# Patient Record
Sex: FEMALE | URBAN - METROPOLITAN AREA
[De-identification: names, ages, dates, MRNs, and addresses within clinical notes are randomized per-mention and may not be internally consistent; named-entity substitution may affect disease eponyms.]

---

## 2017-01-06 ENCOUNTER — OFFICE VISIT (OUTPATIENT)
Dept: FAMILY MEDICINE CLINIC | Age: 22
End: 2017-01-06

## 2017-01-06 ENCOUNTER — HOSPITAL ENCOUNTER (OUTPATIENT)
Dept: LAB | Age: 22
Discharge: HOME | End: 2017-01-06
Payer: OTHER GOVERNMENT

## 2017-01-06 VITALS
WEIGHT: 180 LBS | BODY MASS INDEX: 28.93 KG/M2 | OXYGEN SATURATION: 98 % | TEMPERATURE: 98.1 F | HEART RATE: 68 BPM | SYSTOLIC BLOOD PRESSURE: 124 MMHG | HEIGHT: 66 IN | DIASTOLIC BLOOD PRESSURE: 63 MMHG | RESPIRATION RATE: 20 BRPM

## 2017-01-06 DIAGNOSIS — E55.9 VITAMIN D DEFICIENCY: ICD-10-CM

## 2017-01-06 DIAGNOSIS — Z23 ENCOUNTER FOR IMMUNIZATION: Primary | ICD-10-CM

## 2017-01-06 PROCEDURE — 82306 VITAMIN D 25 HYDROXY: CPT | Performed by: NURSE PRACTITIONER

## 2017-01-06 PROCEDURE — 36415 COLL VENOUS BLD VENIPUNCTURE: CPT | Performed by: NURSE PRACTITIONER

## 2017-01-06 NOTE — PROGRESS NOTES
Marquita Gave 22 y.o. female   Chief Complaint   Patient presents with    Follow-up    Vitamin D Deficiency    Immunization/Injection     Restart HEP B series         1. Have you been to the ER, urgent care clinic since your last visit? Hospitalized since your last visit? No    2. Have you seen or consulted any other health care providers outside of the [address] [place] [address] since your last visit? Include any pap smears or colon screening.  No

## 2017-01-06 NOTE — PATIENT INSTRUCTIONS
Please contact our office if you have any questions about your visit today. Hepatitis B Vaccine: What You Need to Know  Why get vaccinated? Hepatitis B is a serious disease that affects the liver. It is caused by the hepatitis B virus. Hepatitis B can cause mild illness lasting a few weeks, or it can lead to a serious, lifelong illness. Hepatitis B virus infection can be either acute or chronic. Acute hepatitis B virus infection is a short-term illness that occurs within the first 6 months after someone is exposed to the hepatitis B virus. This can lead to:  · Fever, fatigue, loss of appetite, nausea, and/or vomiting. · Jaundice (yellow skin or eyes, dark urine, paul-colored bowel movements). · Pain in muscles, joints, and stomach. Chronic hepatitis B virus infection is a long-term illness that occurs when the hepatitis B virus remains in a person's body. Most people who go on to develop chronic hepatitis B do not have symptoms, but it is still very serious and can lead to:  · Liver damage (cirrhosis). · Liver cancer. · Death. Chronically-infected people can spread hepatitis B virus to others, even if they do not feel or look sick themselves. Up to 1.4 million people in the [place] may have chronic hepatitis B infection. About 90% of infants who get hepatitis B become chronically infected and about 1 out of 4 of them dies. Hepatitis B is spread when blood, semen, or other body fluid infected with the Hepatitis B virus enters the body of a person who is not infected. People can become infected with the virus through:  · Birth (a baby whose mother is infected can be infected at or after birth). · Sharing items such as razors or toothbrushes with an infected person  · Contact with the blood or open sores of an infected person. · Sex with an infected partner. · Sharing needles, syringes, or other drug-injection equipment. · Exposure to blood from needlesticks or other sharp instruments.   Each year about 2,000 people in the [facility] die from hepatitis B-related liver disease. Hepatitis B vaccine can prevent hepatitis B and its consequences, including liver cancer and cirrhosis. Hepatitis B vaccine  Hepatitis B vaccine is made from parts of the hepatitis B virus. It cannot cause hepatitis B infection. The vaccine is usually given as 3 or 4 shots over a 6-month period. Infants should get their first dose of hepatitis B vaccine at birth and will usually complete the series at 7 months of age. All children and adolescents younger than 23years of age who have not yet gotten the vaccine should also be vaccinated. Hepatitis B vaccine is recommended for unvaccinated adults who are at risk for hepatitis B virus infection, including:  · People whose sex partners have hepatitis. · Sexually active persons who are not in a long-term monogamous relationship. · Persons seeking evaluation or treatment for a sexually transmitted disease. · Men who have sexual contact with other men. · People who share needles, syringes, or other drug-injection equipment. · People who have household contact with someone infected with the hepatitis B virus. · Health care and public safety workers at risk for exposure to blood or body fluids. · Residents and staff of facilities for developmentally disabled persons. · Persons in correctional facilities. · Victims of sexual assault or abuse. · Travelers to regions with increased rates of hepatitis B.  · People with chronic liver disease, kidney disease, HIV infection, or diabetes. · Anyone who wants to be protected from hepatitis B. There are no known risks to getting hepatitis B vaccine at the same time as other vaccines. Some people should not get this vaccine  Tell the person who is giving the vaccine:  · If the person getting the vaccine has any severe, life-threatening allergies.  If you ever had a life-threatening allergic reaction after a dose of hepatitis B vaccine, or have a severe allergy to any part of this vaccine, you may be advised not to get vaccinated. Ask your health care provider if you want information about vaccine components. · If the person getting the vaccine is not feeling well. If you have a mild illness, such as a cold, you can probably get the vaccine today. If you are moderately or severely ill, you should probably wait until you recover. Your doctor can advise you. Risks of a vaccine reaction  With any medicine, including vaccines, there is a chance of side effects. These are usually mild and go away on their own, but serious reactions are also possible. Most people who get hepatitis B vaccine do not have any problems with it. Minor problems following hepatitis B vaccine include:  · Soreness where the shot was given. · Temperature of 99.9°F or higher. If these problems occur, they usually begin soon after the shot and last 1 or 2 days. Your doctor can tell you more about these reactions. Other problems that could happen after this vaccine  · People sometimes faint after a medical procedure, including vaccination. Sitting or lying down for about 15 minutes can help prevent fainting and injuries caused by a fall. Tell your provider if you feel dizzy, or have vision changes or ringing in the ears. · Some people get shoulder pain that can be more severe and longer-lasting than the more routine soreness that can follow injections. This happens very rarely. · Any medication can cause a severe allergic reaction. Such reactions from a vaccine are very rare, estimated at about 1 in a million doses, and would happen within a few minutes to a few hours after the vaccination. As with any medicine, there is a very remote chance of a vaccine causing a serious injury or death. The safety of vaccines is always being monitored. For more information, visit: www.cdc.gov/vaccinesafety. What if there is a serious problem? What should I look for?   · Look for anything that concerns you, such as signs of a severe allergic reaction, very high fever, or unusual behavior. Signs of a severe allergic reaction can include hives, swelling of the face and throat, difficulty breathing, a fast heartbeat, dizziness, and weakness. These would usually start a few minutes to a few hours after the vaccination. What should I do? · If you think it is a severe allergic reaction or other emergency that can't wait, call 911 or get the person to the nearest hospital. Otherwise, call your clinic. Afterward, the reaction should be reported to the Vaccine Adverse Event Reporting System (VAERS). Your doctor should file this report, or you can do it yourself through the VAERS web site at www.vaers. hhs.gov, or by calling 9-837.171.8981. VAERS does not give medical advice. The National Vaccine Injury Compensation Program  The National Vaccine Injury Compensation Program (VICP) is a federal program that was created to compensate people who may have been injured by certain vaccines. Persons who believe they may have been injured by a vaccine can learn about the program and about filing a claim by calling 4-112.979.4212 or visiting the [facility] website at www.[facility].gov/vaccinecompensation. There is a time limit to file a claim for compensation. How can I learn more? · Ask your healthcare provider. He or she can give you the vaccine package insert or suggest other sources of information. · Call your local or state health department. · Contact the Centers for Disease Control and Prevention (CDC):  ¨ Call 9-894.813.3396 (1-800-CDC-INFO). ¨ Visit CDC's website at www.cdc.gov/vaccines. Vaccine Information Statement  Hepatitis B Vaccine  7/20/2016  42 U. S.C. § 300aa-26  [place]. S. Department of Health and Human Services  Centers for Disease Control and Prevention  Many Vaccine Information Statements are available in [place] and other languages. See www.immunize.org/vis.   Hojas de [employer] vacunas están disponibles en español y en otros idiomas. Visite www.immunize.org/vis. Care instructions adapted under license by your healthcare professional. If you have questions about a medical condition or this instruction, always ask your healthcare professional. [address] any warranty or liability for your use of this information. Learning About Vitamin D  Why is it important to get enough vitamin D? Your body needs vitamin D to absorb calcium. Calcium keeps your bones and muscles, including your heart, healthy and strong. If your muscles don't get enough calcium, they can cramp, hurt, or feel weak. You may have long-term (chronic) muscle aches and pains. If you don't get enough vitamin D throughout life, you have an increased chance of having thin and brittle bones (osteoporosis) in your later years. Children who don't get enough vitamin D may not grow as much as others their age. They also have a chance of getting a rare disease called rickets. It causes weak bones. Vitamin D and calcium are added to many foods. And your body uses sunshine to make its own vitamin D. How much vitamin D do you need? The [employer] recommends that people ages 3 through 79 get 600 IU (international units) every day. Adults 71 and older need 800 IU every day. Blood tests for vitamin D can check your vitamin D level. But there is no standard normal range used by all laboratories. The [employer] recommends a blood level of 20 ng/mL of vitamin D for healthy bones. And most people in the [place] and [facility]) meet this goal.  How can you get more vitamin D? Foods that contain vitamin D include:  · [place], tuna, and mackerel. These are some of the best foods to eat when you need to get more vitamin D.  · Cheese, egg yolks, and beef liver. These foods have vitamin D in small amounts.   · Milk, soy drinks, orange juice, yogurt, margarine, and some kinds of cereal have vitamin D added to them. Some people don't make vitamin D as well as others. They may have to take extra care in getting enough vitamin D. Things that reduce how much vitamin D your body makes include:  · Dark skin, such as many  Americans have. · Age, especially if you are older than 72. · Digestive problems, such as Crohn's or celiac disease. · Liver and kidney disease. Some people who do not get enough vitamin D may need supplements. Are there any risks from taking vitamin D?  · Too much vitamin D:  ¨ Can damage your kidneys. ¨ Can cause nausea and vomiting, constipation, and weakness. ¨ Raises the amount of calcium in your blood. If this happens, you can get confused or have an irregular heart rhythm. · Vitamin D may interact with other medicines. Tell your doctor about all of the medicines you take, including over-the-counter drugs, herbs, and pills. Tell your doctor about all of your current medical problems. Where can you learn more? Go to http://jerome-jules.info/. Enter [id number] in the search box to learn more about \"Learning About Vitamin D.\"  Current as of: July 26, 2016  Content Version: 11.1  © 6640-6575 [employer]. Care instructions adapted under license by [employer] (which disclaims liability or warranty for this information). If you have questions about a medical condition or this instruction, always ask your healthcare professional. [address] any warranty or liability for your use of this information.

## 2017-01-06 NOTE — PROGRESS NOTES
MARIANO Corrales is a 22 y.o. female   Chief Complaint   Patient presents with    Follow-up    Vitamin D Deficiency    Immunization/Injection     Restart HEP B series     Requesting refill on vitamin D but admits to not taking high dose therapy as previously prescribed. Denies muscle and or leg cramps/pain. Denies chest pain. Denies shortness of breath. Denies abdominal pain, nausea and or vomiting. Past Medical History  Past Medical History   Diagnosis Date    Acne cystic     Appendicitis with peritoneal abscess     Neck fracture (HCC)      C1, C2       Surgical History  Past Surgical History   Procedure Laterality Date    Pr appendectomy,rupt appendx+abscess      Pr cerv spine fusn,anter,[id number]       hip graft        Medications  Current Outpatient Prescriptions   Medication Sig Dispense Refill    drospirenone-ethinyl estradiol (OCELLA) 3-0.03 mg tab Take 1 Tab by mouth daily. 28 Tab 11    clotrimazole-betamethasone (LOTRISONE) topical cream Apply BID-TID x 14 days. Keep applying an additional week after rash clears. 30 g 1    tetrahydrozoline (VISINE) 0.05 % ophthalmic solution Administer 1 Drop to both eyes four (4) times daily.  clindamycin-benzoyl peroxide (DUAC) 1-5 % topical gel Apply  to affected area two (2) times daily as needed. Apply to affected area after the skin has been cleansed and dried.  ibuprofen (MOTRIN) 200 mg tablet Take 200 mg by mouth every six (6) hours as needed.  ergocalciferol (ERGOCALCIFEROL) 50,000 unit capsule Take 1 Cap by mouth two (2) times a week.  8 Cap 12       Allergies  No Known Allergies    Family History  Family History   Problem Relation Age of Onset    MS Maternal Grandmother      suspected    Arthritis-osteo Maternal Grandfather     Hypertension Paternal Grandmother     Diabetes Paternal Grandfather     Hypertension Paternal Grandfather     No Known Problems Mother        Social History  Social History     Social History  Marital status: SINGLE     Spouse name: N/A    Number of children: N/A    Years of education: N/A     Occupational History    Not on file. Social History Main Topics    Smoking status: Never Smoker    Smokeless tobacco: Never Used    Alcohol use No    Drug use: Yes     Special: Prescription, OTC    Sexual activity: Not on file     Other Topics Concern    Not on file     Social History Narrative       Problem List  Patient Active Problem List   Diagnosis Code    Cystic acne L70.0       Review of Systems  Review of Systems   Constitutional: Negative for chills, fever, malaise/fatigue and weight loss. Respiratory: Negative for shortness of breath. Cardiovascular: Negative for chest pain. Gastrointestinal: Negative for abdominal pain, blood in stool, diarrhea, nausea and vomiting. Skin: Negative for rash. Neurological: Negative for headaches. Vital Signs  Vitals:    01/06/17 1108   BP: 124/63   Pulse: 68   Resp: 20   Temp: 98.1 °F (36.7 °C)   TempSrc: Oral   SpO2: 98%   Weight: 180 lb (81.6 kg)   Height: 5' 6\" (1.676 m)   PainSc:   0 - No pain   LMP: 12/20/2016       Physical Exam  Physical Exam   Constitutional: She is oriented to person, place, and time. Eyes: Conjunctivae are normal.   Cardiovascular: Normal rate and regular rhythm. Pulmonary/Chest: Effort normal and breath sounds normal. No respiratory distress. Abdominal: Soft. Bowel sounds are normal.   Neurological: She is alert and oriented to person, place, and time. Psychiatric: She has a normal mood and affect. Her behavior is normal.   Vitals reviewed.       Diagnostics  Orders Placed This Encounter    Hepatitis B vaccine, adult dosage, IM    VITAMIN D, 25 HYDROXY     Standing Status:   Future     Standing Expiration Date:   1/7/2018       Results  Results for orders placed or performed during the hospital encounter of 06/07/16   TSH 3RD GENERATION   Result Value Ref Range    TSH 1.45 0.36 - 3.74 uIU/mL   T4, FREE Result Value Ref Range    T4, Free 1.1 0.7 - 1.5 NG/DL   T3, FREE   Result Value Ref Range    Triiodothyronine (T3), free 3.4 2.3 - 4.2 PG/ML   VITAMIN D, 25 HYDROXY   Result Value Ref Range    Vitamin D 25-Hydroxy 19.0 (L) 30 - 100 ng/mL   CBC WITH AUTOMATED DIFF   Result Value Ref Range    WBC 7.6 4.6 - 13.2 K/uL    RBC 4.42 4.20 - 5.30 M/uL    HGB 11.7 (L) 12.0 - 16.0 g/dL    HCT 36.1 35.0 - 45.0 %    MCV 81.7 74.0 - 97.0 FL    MCH 26.5 24.0 - 34.0 PG    MCHC 32.4 31.0 - 37.0 g/dL    RDW 13.8 11.6 - 14.5 %    PLATELET 493 503 - 542 K/uL    MPV 9.8 9.2 - 11.8 FL    NEUTROPHILS 59 40 - 73 %    LYMPHOCYTES 33 21 - 52 %    MONOCYTES 6 3 - 10 %    EOSINOPHILS 2 0 - 5 %    BASOPHILS 0 0 - 2 %    ABS. NEUTROPHILS 4.4 1.8 - 8.0 K/UL    ABS. LYMPHOCYTES 2.5 0.9 - 3.6 K/UL    ABS. MONOCYTES 0.5 0.05 - 1.2 K/UL    ABS. EOSINOPHILS 0.1 0.0 - 0.4 K/UL    ABS. BASOPHILS 0.0 0.0 - 0.06 K/UL    DF AUTOMATED             Assessment and Plan  Altaf Rios was seen today for follow-up, vitamin d deficiency and immunization/injection. Diagnoses and all orders for this visit:    Encounter for immunization  -     Hepatitis B vaccine, adult dosage, IM    Vitamin D deficiency  -     VITAMIN D, 25 HYDROXY; Future        After care summary printed and reviewed with patient. Plan reviewed with patient. Questions answered. Patient verbalized understanding of plan and is in agreement with plan. Patient to follow up as needed with  Dr. Lela Shah or earlier if symptoms occur or worsen. Encouraged the use of my chart.     Justen Moore, DAISYP-C

## 2017-01-06 NOTE — MR AVS SNAPSHOT
Visit Information Date & Time Provider Department Dept. Phone Encounter #  
 1/6/2017 11:00 AM Elda Pineda NP [facility] [phone] [id number] Follow-up Instructions Return if symptoms worsen or fail to improve. Upcoming Health Maintenance Date Due  
 HPV AGE 9Y-34Y (1 of 3 - Female 3 Dose Series) 9/29/2007 DTaP/Tdap/Td series (7 - Td) 4/28/2020 Allergies as of 1/6/2017  Review Complete On: 1/6/2017 By: Elda Pineda NP No Known Allergies Current Immunizations  Reviewed on 1/6/2017 Name Date DTaP 6/12/2001, 2/13/1999, 4/22/1997, 2/12/1997, 1996 Hep A Vaccine 2/17/2004, 8/15/2003 Hep B Vaccine 2/12/1997, 1996, 1996 Hep B Vaccine (Adult) 1/6/2017 Hib 4/22/1997, 2/12/1997, 1996 Influenza Vaccine 10/2/2016 MMR 6/12/2001, 2/23/1999 Pneumococcal Vaccine (Unspecified Type) 4/22/1997, 2/12/1997 Poliovirus vaccine 6/12/2001, 1996 TB Skin Test (PPD) Intradermal 1/13/2016, 1/19/2015 11:17 AM, 1/7/2015 Tdap 4/28/2010 Varicella Virus Vaccine 2/23/1999 Reviewed by Veronica Moore LPN on 3/6/9281 at 37:20 AM  
You Were Diagnosed With   
  
 Codes Comments Encounter for immunization    -  Primary ICD-10-CM: H41 ICD-9-CM: V03.89 Vitamin D deficiency     ICD-10-CM: E55.9 ICD-9-CM: 268.9 Vitals BP Pulse Temp Resp Height(growth percentile) Weight(growth percentile) 124/63 (BP 1 Location: Left arm, BP Patient Position: Sitting) 68 98.1 °F (36.7 °C) (Oral) 20 5' 6\" (1.676 m) 180 lb (81.6 kg) LMP SpO2 BMI OB Status Smoking Status 12/20/2016 98% 29.05 kg/m2 Having regular periods Never Smoker BMI and BSA Data Body Mass Index Body Surface Area 29.05 kg/m 2 1.95 m 2 Preferred Pharmacy Pharmacy Name Phone [id number], [address] [place] [address] Your Updated Medication List  
  
   
 This list is accurate as of: 1/6/17 11:40 AM.  Always use your most recent med list.  
  
  
  
  
 clotrimazole-betamethasone topical cream  
Commonly known as:  Alphonsa Spies Apply BID-TID x 14 days. Keep applying an additional week after rash clears. drospirenone-ethinyl estradiol 3-0.03 mg Tab Commonly known as:  Linda Rangel Take 1 Tab by mouth daily. DUAC 1-5 % topical gel Generic drug:  clindamycin-benzoyl peroxide Apply  to affected area two (2) times daily as needed. Apply to affected area after the skin has been cleansed and dried. ergocalciferol 50,000 unit capsule Commonly known as:  ERGOCALCIFEROL Take 1 Cap by mouth two (2) times a week. ibuprofen 200 mg tablet Commonly known as:  MOTRIN Take 200 mg by mouth every six (6) hours as needed. VISINE 0.05 % ophthalmic solution Generic drug:  tetrahydrozoline Administer 1 Drop to both eyes four (4) times daily. We Performed the Following HEPATITIS B VACCINE, ADULT DOSAGE, IM [44030 CPT(R)] Follow-up Instructions Return if symptoms worsen or fail to improve. To-Do List   
 01/06/2017 Lab:  VITAMIN D, 25 HYDROXY Patient Instructions Please contact our office if you have any questions about your visit today. Hepatitis B Vaccine: What You Need to Know Why get vaccinated? Hepatitis B is a serious disease that affects the liver. It is caused by the hepatitis B virus. Hepatitis B can cause mild illness lasting a few weeks, or it can lead to a serious, lifelong illness. Hepatitis B virus infection can be either acute or chronic. Acute hepatitis B virus infection is a short-term illness that occurs within the first 6 months after someone is exposed to the hepatitis B virus. This can lead to: · Fever, fatigue, loss of appetite, nausea, and/or vomiting. · Jaundice (yellow skin or eyes, dark urine, paul-colored bowel movements). · Pain in muscles, joints, and stomach. Chronic hepatitis B virus infection is a long-term illness that occurs when the hepatitis B virus remains in a person's body. Most people who go on to develop chronic hepatitis B do not have symptoms, but it is still very serious and can lead to: · Liver damage (cirrhosis). · Liver cancer. · Death. Chronically-infected people can spread hepatitis B virus to others, even if they do not feel or look sick themselves. Up to 1.4 million people in the [place] may have chronic hepatitis B infection. About 90% of infants who get hepatitis B become chronically infected and about 1 out of 4 of them dies. Hepatitis B is spread when blood, semen, or other body fluid infected with the Hepatitis B virus enters the body of a person who is not infected. People can become infected with the virus through: · Birth (a baby whose mother is infected can be infected at or after birth). · Sharing items such as razors or toothbrushes with an infected person · Contact with the blood or open sores of an infected person. · Sex with an infected partner. · Sharing needles, syringes, or other drug-injection equipment. · Exposure to blood from needlesticks or other sharp instruments. Each year about 2,000 people in the [facility] die from hepatitis B-related liver disease. Hepatitis B vaccine can prevent hepatitis B and its consequences, including liver cancer and cirrhosis. Hepatitis B vaccine Hepatitis B vaccine is made from parts of the hepatitis B virus. It cannot cause hepatitis B infection. The vaccine is usually given as 3 or 4 shots over a 6-month period. Infants should get their first dose of hepatitis B vaccine at birth and will usually complete the series at 7 months of age. All children and adolescents younger than 23years of age who have not yet gotten the vaccine should also be vaccinated.  
Hepatitis B vaccine is recommended for unvaccinated adults who are at risk for hepatitis B virus infection, including: · People whose sex partners have hepatitis. · Sexually active persons who are not in a long-term monogamous relationship. · Persons seeking evaluation or treatment for a sexually transmitted disease. · Men who have sexual contact with other men. · People who share needles, syringes, or other drug-injection equipment. · People who have household contact with someone infected with the hepatitis B virus. · Health care and public safety workers at risk for exposure to blood or body fluids. · Residents and staff of facilities for developmentally disabled persons. · Persons in correctional facilities. · Victims of sexual assault or abuse. · Travelers to regions with increased rates of hepatitis B. 
· People with chronic liver disease, kidney disease, HIV infection, or diabetes. · Anyone who wants to be protected from hepatitis B. There are no known risks to getting hepatitis B vaccine at the same time as other vaccines. Some people should not get this vaccine Tell the person who is giving the vaccine: · If the person getting the vaccine has any severe, life-threatening allergies. If you ever had a life-threatening allergic reaction after a dose of hepatitis B vaccine, or have a severe allergy to any part of this vaccine, you may be advised not to get vaccinated. Ask your health care provider if you want information about vaccine components. · If the person getting the vaccine is not feeling well. If you have a mild illness, such as a cold, you can probably get the vaccine today. If you are moderately or severely ill, you should probably wait until you recover. Your doctor can advise you. Risks of a vaccine reaction With any medicine, including vaccines, there is a chance of side effects. These are usually mild and go away on their own, but serious reactions are also possible. Most people who get hepatitis B vaccine do not have any problems with it. Minor problems following hepatitis B vaccine include: · Soreness where the shot was given. · Temperature of 99.9°F or higher. If these problems occur, they usually begin soon after the shot and last 1 or 2 days. Your doctor can tell you more about these reactions. Other problems that could happen after this vaccine · People sometimes faint after a medical procedure, including vaccination. Sitting or lying down for about 15 minutes can help prevent fainting and injuries caused by a fall. Tell your provider if you feel dizzy, or have vision changes or ringing in the ears. · Some people get shoulder pain that can be more severe and longer-lasting than the more routine soreness that can follow injections. This happens very rarely. · Any medication can cause a severe allergic reaction. Such reactions from a vaccine are very rare, estimated at about 1 in a million doses, and would happen within a few minutes to a few hours after the vaccination. As with any medicine, there is a very remote chance of a vaccine causing a serious injury or death. The safety of vaccines is always being monitored. For more information, visit: www.cdc.gov/vaccinesafety. What if there is a serious problem? What should I look for? · Look for anything that concerns you, such as signs of a severe allergic reaction, very high fever, or unusual behavior. Signs of a severe allergic reaction can include hives, swelling of the face and throat, difficulty breathing, a fast heartbeat, dizziness, and weakness. These would usually start a few minutes to a few hours after the vaccination. What should I do? · If you think it is a severe allergic reaction or other emergency that can't wait, call 911 or get the person to the nearest hospital. Otherwise, call your clinic. Afterward, the reaction should be reported to the Vaccine Adverse Event Reporting System (VAERS).  Your doctor should file this report, or you can do it yourself through the VAERS web site at www.vaers. hhs.gov, or by calling 4-332.943.9517. VAERS does not give medical advice. The National Vaccine Injury Compensation Program 
The National Vaccine Injury Compensation Program (VICP) is a federal program that was created to compensate people who may have been injured by certain vaccines. Persons who believe they may have been injured by a vaccine can learn about the program and about filing a claim by calling 8-312.723.9013 or visiting the SocialRep website at www.[facility].gov/vaccinecompensation. There is a time limit to file a claim for compensation. How can I learn more? · Ask your healthcare provider. He or she can give you the vaccine package insert or suggest other sources of information. · Call your local or state health department. · Contact the Centers for Disease Control and Prevention (CDC): 
¨ Call 9-890.872.9690 (1-800-CDC-INFO). ¨ Visit CDC's website at www.cdc.gov/vaccines. Vaccine Information Statement Hepatitis B Vaccine 7/20/2016 
42 MYLENE Schaffer [id number] [place]. [place]. Department of Health and [employer] Centers for Disease Control and Prevention Many Vaccine Information Statements are available in [place] and other languages. See www.immunize.org/vis. Hojas de información sobre vacunas están disponibles en español y en otros idiomas. Visite www.immunize.org/vis. Care instructions adapted under license by your healthcare professional. If you have questions about a medical condition or this instruction, always ask your healthcare professional. [place] [address] any warranty or liability for your use of this information. Learning About Vitamin D Why is it important to get enough vitamin D? Your body needs vitamin D to absorb calcium. Calcium keeps your bones and muscles, including your heart, healthy and strong. If your muscles don't get enough calcium, they can cramp, hurt, or feel weak.  You may have long-term (chronic) muscle aches and pains. If you don't get enough vitamin D throughout life, you have an increased chance of having thin and brittle bones (osteoporosis) in your later years. Children who don't get enough vitamin D may not grow as much as others their age. They also have a chance of getting a rare disease called rickets. It causes weak bones. Vitamin D and calcium are added to many foods. And your body uses sunshine to make its own vitamin D. How much vitamin D do you need? The [place] of [employer] recommends that people ages 3 through 79 get 600 IU (international units) every day. Adults 71 and older need 800 IU every day. Blood tests for vitamin D can check your vitamin D level. But there is no standard normal range used by all laboratories. The [place] of Medicine recommends a blood level of 20 ng/mL of vitamin D for healthy bones. And most people in the [place] and [facility]) meet this goal. 
How can you get more vitamin D? Foods that contain vitamin D include: 
· Pandora, tuna, and mackerel. These are some of the best foods to eat when you need to get more vitamin D. 
· Cheese, egg yolks, and beef liver. These foods have vitamin D in small amounts. · Milk, soy drinks, orange juice, yogurt, margarine, and some kinds of cereal have vitamin D added to them. Some people don't make vitamin D as well as others. They may have to take extra care in getting enough vitamin D. Things that reduce how much vitamin D your body makes include: · Dark skin, such as many  Americans have. · Age, especially if you are older than 72. · Digestive problems, such as Crohn's or celiac disease. · Liver and kidney disease. Some people who do not get enough vitamin D may need supplements. Are there any risks from taking vitamin D? 
· Too much vitamin D: 
¨ Can damage your kidneys. ¨ Can cause nausea and vomiting, constipation, and weakness. ¨ Raises the amount of calcium in your blood. If this happens, you can get confused or have an irregular heart rhythm. · Vitamin D may interact with other medicines. Tell your doctor about all of the medicines you take, including over-the-counter drugs, herbs, and pills. Tell your doctor about all of your current medical problems. Where can you learn more? Go to http://jerome-jules.info/. Enter [id number] in the search box to learn more about \"Learning About Vitamin D.\" 
Current as of: July 26, 2016 Content Version: 11.1 © 4835-5094 [employer]. Care instructions adapted under license by [employer] (which disclaims liability or warranty for this information). If you have questions about a medical condition or this instruction, always ask your healthcare professional. [address] any warranty or liability for your use of this information. Introducing \[facility]\"" & HEALTH SERVICES! Dear Romana Drew: 
Thank you for requesting a CHOOMOGO account. Our records indicate that you already have an active CHOOMOGO account. You can access your account anytime at [email or web address] Did you know that you can access your hospital and ER discharge instructions at any time in CHOOMOGO? You can also review all of your test results from your hospital stay or ER visit. Additional Information If you have questions, please visit the Frequently Asked Questions section of the CHOOMOGO website at [email or web address] Remember, CHOOMOGO is NOT to be used for urgent needs. For medical emergencies, dial 911. Now available from your iPhone and Android! Please provide this summary of care documentation to your next provider. Your primary care clinician is listed as Yobany Childress. If you have any questions after today's visit, please call [phone].

## 2017-01-10 ENCOUNTER — CLINICAL SUPPORT (OUTPATIENT)
Dept: FAMILY MEDICINE CLINIC | Age: 22
End: 2017-01-10

## 2017-01-10 VITALS — TEMPERATURE: 97.9 F

## 2017-01-10 DIAGNOSIS — Z11.1 SCREENING-PULMONARY TB: Primary | ICD-10-CM

## 2017-01-10 LAB
MM INDURATION POC: 0 MM (ref 0–5)
PPD POC: NEGATIVE NEGATIVE

## 2017-01-10 NOTE — PROGRESS NOTES
PPD Placement note  Asad Regan, 22 y.o. female is here today for placement of PPD test  Reason for PPD test: school/work  Pt taken PPD test before: yes  Verified in allergy area and with patient that they are not allergic to the products PPD is made of (Phenol or Tween). No:   Is patient taking any oral or IV steroid medication now or have they taken it in the last month? no  Has the patient ever received the BCG vaccine?: no  Has the patient been in recent contact with anyone known or suspected of having active TB disease?: no      O: Alert and oriented in NAD. P:  PPD placed on 1/10/2017 in the left forearm. Patient advised to return for reading within 48-72 hours.

## 2017-01-10 NOTE — MR AVS SNAPSHOT
Visit Information Date & Time Provider Department Dept. Phone Encounter #  
 1/10/2017  4:30 PM Gerardo Norwood NP [address] [phone] [id number] Follow-up Instructions Return for PPD READING. Your Appointments 1/10/2017  4:30 PM  
Nurse Visit with Gerardo Norwood NP [address] (--) Appt Note: ppd  
 [address] [place] [id number] [phone]  
  
   
 [address] [place] [place] [address] Upcoming Health Maintenance Date Due  
 HPV AGE 9Y-34Y (1 of 3 - Female 3 Dose Series) 9/29/2007 DTaP/Tdap/Td series (7 - Td) 4/28/2020 Allergies as of 1/10/2017  Review Complete On: 1/6/2017 By: Gerardo Norwood NP No Known Allergies Current Immunizations  Reviewed on 1/6/2017 Name Date DTaP 6/12/2001, 2/13/1999, 4/22/1997, 2/12/1997, 1996 Hep A Vaccine 2/17/2004, 8/15/2003 Hep B Vaccine 2/12/1997, 1996, 1996 Hep B Vaccine (Adult) 1/6/2017 Hib 4/22/1997, 2/12/1997, 1996 Influenza Vaccine 10/2/2016 MMR 6/12/2001, 2/23/1999 Pneumococcal Vaccine (Unspecified Type) 4/22/1997, 2/12/1997 Poliovirus vaccine 6/12/2001, 1996 TB Skin Test (PPD) Intradermal 1/10/2017, 1/13/2016, 1/19/2015 11:17 AM, 1/7/2015 Tdap 4/28/2010 Varicella Virus Vaccine 2/23/1999 Not reviewed this visit You Were Diagnosed With   
  
 Codes Comments Screening-pulmonary TB    -  Primary ICD-10-CM: Z11.1 ICD-9-CM: V74.1 Vitals Temp LMP OB Status Smoking Status 97.9 °F (36.6 °C) (Oral) 12/20/2016 Having regular periods Never Smoker Preferred Pharmacy [facility]acy Name [id number] [address] Your Updated Medication List  
  
   
This list is accurate as of: 1/10/17  2:57 PM.  Always use your most recent med list.  
  
  
  
  
 clotrimazole-betamethasone topical cream  
Commonly known as:  Collene Bones Apply BID-TID x 14 days. Keep applying an additional week after rash clears. drospirenone-ethinyl estradiol 3-0.03 mg Tab Commonly known as:  Ardyce Floss Take 1 Tab by mouth daily. DUAC 1-5 % topical gel Generic drug:  clindamycin-benzoyl peroxide Apply  to affected area two (2) times daily as needed. Apply to affected area after the skin has been cleansed and dried. ergocalciferol 50,000 unit capsule Commonly known as:  ERGOCALCIFEROL Take 1 Cap by mouth two (2) times a week. ibuprofen 200 mg tablet Commonly known as:  MOTRIN Take 200 mg by mouth every six (6) hours as needed. VISINE 0.05 % ophthalmic solution Generic drug:  tetrahydrozoline Administer 1 Drop to both eyes four (4) times daily. We Performed the Following AMB POC TUBERCULOSIS, INTRADERMAL (SKIN TEST) [48883 CPT(R)] Follow-up Instructions Return for PPD READING. Introducing [facility] & HEALTH SERVICES! Dear Pari Quiros: 
Thank you for requesting a Populr account. Our records indicate that you already have an active Populr account. You can access your account anytime at [email or web address] Did you know that you can access your hospital and ER discharge instructions at any time in Populr? You can also review all of your test results from your hospital stay or ER visit. Additional Information If you have questions, please visit the Frequently Asked Questions section of the Populr website at [email or web address] Remember, Populr is NOT to be used for urgent needs. For medical emergencies, dial 911. Now available from your iPhone and Android! Please provide this summary of care documentation to your next provider. Your primary care clinician is listed as Ede Frausto. If you have any questions after today's visit, please call [phone].

## 2017-01-11 LAB — 25(OH)D3 SERPL-MCNC: 29.6 NG/ML (ref 30–100)

## 2017-01-11 NOTE — PROGRESS NOTES
Vit D level is now 29.6. Begin taking OTC Vit D3 2000 international units daily. Will recheck vitamin D level in three months.

## 2017-02-14 ENCOUNTER — OFFICE VISIT (OUTPATIENT)
Dept: FAMILY MEDICINE CLINIC | Age: 22
End: 2017-02-14

## 2017-02-14 VITALS
TEMPERATURE: 97.5 F | HEIGHT: 66 IN | HEART RATE: 79 BPM | DIASTOLIC BLOOD PRESSURE: 69 MMHG | BODY MASS INDEX: 29.41 KG/M2 | RESPIRATION RATE: 20 BRPM | SYSTOLIC BLOOD PRESSURE: 128 MMHG | OXYGEN SATURATION: 98 % | WEIGHT: 183 LBS

## 2017-02-14 DIAGNOSIS — M25.571 ACUTE RIGHT ANKLE PAIN: Primary | ICD-10-CM

## 2017-02-14 DIAGNOSIS — Z23 ENCOUNTER FOR IMMUNIZATION: ICD-10-CM

## 2017-02-14 DIAGNOSIS — M25.531 RIGHT WRIST PAIN: ICD-10-CM

## 2017-02-14 NOTE — PROGRESS NOTES
HISTORY OF PRESENT ILLNESS  Chary Garcia is a 22 y.o. female. Chief Complaint   Patient presents with    Ankle Injury     RT with pain x 1 week ago while walking. Pt denies any swelling. Pain scale 3/10 only when walking    Immunization/Injection     2nd HEP B        HPI  Pt is here with a C/O of right ankle pain x 1 week. Pt states that she was on a walk with her mother when felt a dull aching pain in the right ankle, but continued walking. Pt states that by the end of the walk, she was experiencing a sharp pain in the right ankle. Pt denies any swelling of the ankle. She has pain with wt bearing if she is barefoot. Review of Systems   Constitutional: Negative. HENT: Negative. Respiratory: Negative. Cardiovascular: Negative. Musculoskeletal: Positive for joint pain. All other systems reviewed and are negative. Physical Exam   Constitutional: She is oriented to person, place, and time. She appears well-developed and well-nourished. HENT:   Head: Normocephalic and atraumatic. Right Ear: External ear normal.   Left Ear: External ear normal.   Nose: Nose normal.   Eyes: Conjunctivae and EOM are normal.   Neck: Normal range of motion. Neck supple. No JVD present. Cardiovascular: Normal rate, regular rhythm and normal heart sounds. Exam reveals no gallop and no friction rub. No murmur heard. Pulmonary/Chest: Effort normal and breath sounds normal. She has no wheezes. She has no rhonchi. She has no rales. Musculoskeletal: She exhibits no edema. Right ankle: She exhibits no swelling. No lateral malleolus, no medial malleolus, no AITFL, no CF ligament, no posterior TFL, no head of 5th metatarsal and no proximal fibula tenderness found. Feet:    Neurological: She is alert and oriented to person, place, and time. Coordination normal.   Skin: Skin is warm and dry. Psychiatric: She has a normal mood and affect.  Her behavior is normal. Judgment and thought content normal.   Nursing note and vitals reviewed. ASSESSMENT and Sherlyneda Nigel was seen today for ankle injury and immunization/injection.     Diagnoses and all orders for this visit:    Acute right ankle pain  -     XR ANKLE RT MIN 3 V; Future    Encounter for immunization  -     Hepatitis B vaccine, adult dosage, IM    Right wrist pain  -     REFERRAL TO HAND SURGERY      Follow-up Disposition: as indicated

## 2025-05-02 NOTE — PROGRESS NOTES
----- Message from Susan Zhu MA sent at 5/2/2025  9:05 AM CDT -----  Schedule 6 month repeat labs (message in portal to patient from Will)  Needs repeat vit d level in 6 months   Alia Bennett 22 y.o. female   Chief Complaint   Patient presents with    Ankle Injury     RT with pain x 1 week ago while walking. Pt denies any swelling. Pain scale 3/10 only when walking    Immunization/Injection     2nd HEP B          1. Have you been to the ER, urgent care clinic since your last visit? Hospitalized since your last visit? No    2. Have you seen or consulted any other health care providers outside of the [address] [place] [address] since your last visit? Include any pap smears or colon screening.  No